# Patient Record
Sex: MALE
[De-identification: names, ages, dates, MRNs, and addresses within clinical notes are randomized per-mention and may not be internally consistent; named-entity substitution may affect disease eponyms.]

---

## 2020-04-14 ENCOUNTER — NURSE TRIAGE (OUTPATIENT)
Dept: OTHER | Facility: CLINIC | Age: 65
End: 2020-04-14

## 2020-04-14 NOTE — TELEPHONE ENCOUNTER
Patient called in with concerns of symptoms of Covid 19. Patient talking continuously. Patient gives name and , asked for MMO # patient could not provide. Began to ask patient screening questions and patient states \" If you're just Josh Casons ask me a bunch of those questions about symptoms and stuff Ill just call the [facility] tomorrow. I already filled out papers and sent them in and they said I probably dont have it, but now my stomach hurts and feels sick. I know I have it. I'll call someone else tomorrow thanks nurse lady. \"     Unable to triage patient appropriately. Patient would not answer screening questions or inform of what symptoms exactly are present. Call was ended by patient.